# Patient Record
Sex: FEMALE | Race: WHITE | NOT HISPANIC OR LATINO | Employment: OTHER | URBAN - METROPOLITAN AREA
[De-identification: names, ages, dates, MRNs, and addresses within clinical notes are randomized per-mention and may not be internally consistent; named-entity substitution may affect disease eponyms.]

---

## 2024-06-13 RX ORDER — GABAPENTIN 800 MG/1
800 TABLET ORAL 3 TIMES DAILY
COMMUNITY
Start: 2024-05-31

## 2024-06-13 RX ORDER — PHENTERMINE HYDROCHLORIDE 37.5 MG/1
30 CAPSULE ORAL EVERY MORNING
COMMUNITY

## 2024-06-13 RX ORDER — BACLOFEN 10 MG/1
10 TABLET ORAL 3 TIMES DAILY
COMMUNITY
Start: 2024-04-30

## 2024-06-13 RX ORDER — IBUPROFEN 800 MG/1
800 TABLET ORAL EVERY 6 HOURS PRN
COMMUNITY
Start: 2024-05-31

## 2024-06-13 RX ORDER — ESOMEPRAZOLE MAGNESIUM 40 MG/1
40 CAPSULE, DELAYED RELEASE ORAL
COMMUNITY
Start: 2024-03-01

## 2024-06-13 RX ORDER — HYDROXYZINE HYDROCHLORIDE 25 MG/1
25 TABLET, FILM COATED ORAL EVERY 4 HOURS PRN
COMMUNITY
Start: 2024-04-30

## 2024-06-13 RX ORDER — ESCITALOPRAM OXALATE 20 MG/1
20 TABLET ORAL DAILY
COMMUNITY
Start: 2024-04-30

## 2024-06-13 NOTE — PRE-PROCEDURE INSTRUCTIONS
PATIENT INSTRUCTED TO BE:    - NPO AFTER MIDNIGHT EXCEPT CAN HAVE CLEAR LIQUIDS 2 HOURS PRIOR TO SURGERY ARRIVAL TIME     - TO HOLD ALL VITAMINS, SUPPLEMENTS, NSAIDS FOR ONE WEEK PRIOR TO THEIR SURGICAL PROCEDURE    - INSTRUCTED PT TO USE SURGICAL SOAP 1 TIME THE NIGHT PRIOR TO SURGERY OR THE AM OF SURGERY.   USE SOAP FROM NECK TO TOES AVOID THEIR FACE, HAIR, AND PRIVATE PARTS. INSTRUCTED NO LOTIONS, JEWELRY, PIERCINGS, OR DEODORANT DAY OF SURGERY        - INSTRUCTED TO TAKE THE FOLLOWING MEDICATIONS THE DAY OF SURGERY:   Gabapentin, Nexium, Lexapro    PATIENT VERBALIZED UNDERSTANDING

## 2024-06-18 ENCOUNTER — ANESTHESIA EVENT (OUTPATIENT)
Dept: PERIOP | Facility: HOSPITAL | Age: 54
End: 2024-06-18
Payer: MEDICARE

## 2024-06-18 NOTE — ANESTHESIA PREPROCEDURE EVALUATION
Anesthesia Evaluation     Patient summary reviewed and Nursing notes reviewed   no history of anesthetic complications:   NPO Solid Status: > 8 hours  NPO Liquid Status: > 2 hours           Airway   Mallampati: I  TM distance: >3 FB  Neck ROM: full  No difficulty expected  Dental - normal exam     Pulmonary - negative pulmonary ROS and normal exam    breath sounds clear to auscultation  Cardiovascular - negative cardio ROS and normal exam  Exercise tolerance: good (4-7 METS)    Rhythm: regular  Rate: normal        Neuro/Psych  (+) psychiatric history Anxiety and Depression  GI/Hepatic/Renal/Endo    (+) GERD (nexium) well controlled    Musculoskeletal (-) negative ROS    Abdominal  - normal exam   Substance History - negative use     OB/GYN negative ob/gyn ROS         Other - negative ROS       ROS/Med Hx Other: PAT Nursing Notes unavailable.                 Anesthesia Plan    ASA 2     general     (Patient understands anesthesia not responsible for dental damage.)  intravenous induction     Anesthetic plan, risks, benefits, and alternatives have been provided, discussed and informed consent has been obtained with: patient.    Plan discussed with CRNA.    CODE STATUS:

## 2024-06-19 ENCOUNTER — ANESTHESIA (OUTPATIENT)
Dept: PERIOP | Facility: HOSPITAL | Age: 54
End: 2024-06-19
Payer: MEDICARE

## 2024-06-19 ENCOUNTER — HOSPITAL ENCOUNTER (OUTPATIENT)
Facility: HOSPITAL | Age: 54
Setting detail: HOSPITAL OUTPATIENT SURGERY
Discharge: HOME | End: 2024-06-19
Attending: PODIATRIST | Admitting: PODIATRIST
Payer: MEDICARE

## 2024-06-19 ENCOUNTER — APPOINTMENT (OUTPATIENT)
Dept: GENERAL RADIOLOGY | Facility: HOSPITAL | Age: 54
End: 2024-06-19
Payer: MEDICARE

## 2024-06-19 VITALS
HEART RATE: 55 BPM | OXYGEN SATURATION: 98 % | HEIGHT: 66 IN | SYSTOLIC BLOOD PRESSURE: 143 MMHG | BODY MASS INDEX: 27.92 KG/M2 | WEIGHT: 173.72 LBS | DIASTOLIC BLOOD PRESSURE: 90 MMHG | TEMPERATURE: 97 F | RESPIRATION RATE: 16 BRPM

## 2024-06-19 PROBLEM — M20.12 HALLUX VALGUS WITH BUNIONS OF LEFT FOOT: Status: RESOLVED | Noted: 2024-05-17 | Resolved: 2024-06-19

## 2024-06-19 PROBLEM — M79.672 PAIN OF LEFT FOOT: Status: RESOLVED | Noted: 2024-05-17 | Resolved: 2024-06-19

## 2024-06-19 PROBLEM — M21.612 HALLUX VALGUS WITH BUNIONS OF LEFT FOOT: Status: RESOLVED | Noted: 2024-05-17 | Resolved: 2024-06-19

## 2024-06-19 PROCEDURE — 25010000002 ONDANSETRON PER 1 MG: Performed by: NURSE ANESTHETIST, CERTIFIED REGISTERED

## 2024-06-19 PROCEDURE — 25810000003 LACTATED RINGERS PER 1000 ML: Performed by: ANESTHESIOLOGY

## 2024-06-19 PROCEDURE — C1713 ANCHOR/SCREW BN/BN,TIS/BN: HCPCS | Performed by: PODIATRIST

## 2024-06-19 PROCEDURE — 25010000002 MIDAZOLAM PER 1MG: Performed by: ANESTHESIOLOGY

## 2024-06-19 PROCEDURE — 25010000002 CEFAZOLIN PER 500 MG: Performed by: STUDENT IN AN ORGANIZED HEALTH CARE EDUCATION/TRAINING PROGRAM

## 2024-06-19 PROCEDURE — 25810000003 LACTATED RINGERS PER 1000 ML: Performed by: NURSE ANESTHETIST, CERTIFIED REGISTERED

## 2024-06-19 PROCEDURE — 25010000002 SUGAMMADEX 200 MG/2ML SOLUTION: Performed by: NURSE ANESTHETIST, CERTIFIED REGISTERED

## 2024-06-19 PROCEDURE — 25010000002 HYDROMORPHONE 1 MG/ML SOLUTION: Performed by: NURSE ANESTHETIST, CERTIFIED REGISTERED

## 2024-06-19 PROCEDURE — 25010000002 PROPOFOL 200 MG/20ML EMULSION: Performed by: NURSE ANESTHETIST, CERTIFIED REGISTERED

## 2024-06-19 PROCEDURE — 25010000002 FENTANYL CITRATE (PF) 50 MCG/ML SOLUTION: Performed by: NURSE ANESTHETIST, CERTIFIED REGISTERED

## 2024-06-19 PROCEDURE — 25010000002 BUPIVACAINE (PF) 0.25 % SOLUTION: Performed by: PODIATRIST

## 2024-06-19 PROCEDURE — 76000 FLUOROSCOPY <1 HR PHYS/QHP: CPT

## 2024-06-19 DEVICE — RAPID COMPRESSION IMPLANTS
Type: IMPLANTABLE DEVICE | Site: FOOT | Status: FUNCTIONAL
Brand: LAPIPLASTY SPEEDPLATE

## 2024-06-19 DEVICE — 4.0MM FULLY THREADED SCREW - LONG (38MM/42MM)
Type: IMPLANTABLE DEVICE | Site: FOOT | Status: FUNCTIONAL
Brand: LAPIPLASTY® 4.0MM FULLY THREADED SCREW

## 2024-06-19 RX ORDER — MAGNESIUM HYDROXIDE 1200 MG/15ML
LIQUID ORAL AS NEEDED
Status: DISCONTINUED | OUTPATIENT
Start: 2024-06-19 | End: 2024-06-19 | Stop reason: HOSPADM

## 2024-06-19 RX ORDER — OXYCODONE HYDROCHLORIDE 5 MG/1
5 TABLET ORAL
Status: COMPLETED | OUTPATIENT
Start: 2024-06-19 | End: 2024-06-19

## 2024-06-19 RX ORDER — MIDAZOLAM HYDROCHLORIDE 2 MG/2ML
2 INJECTION, SOLUTION INTRAMUSCULAR; INTRAVENOUS ONCE
Status: COMPLETED | OUTPATIENT
Start: 2024-06-19 | End: 2024-06-19

## 2024-06-19 RX ORDER — MEPERIDINE HYDROCHLORIDE 25 MG/ML
12.5 INJECTION INTRAMUSCULAR; INTRAVENOUS; SUBCUTANEOUS
Status: DISCONTINUED | OUTPATIENT
Start: 2024-06-19 | End: 2024-06-19 | Stop reason: HOSPADM

## 2024-06-19 RX ORDER — PROMETHAZINE HYDROCHLORIDE 12.5 MG/1
25 TABLET ORAL ONCE AS NEEDED
Status: DISCONTINUED | OUTPATIENT
Start: 2024-06-19 | End: 2024-06-19 | Stop reason: HOSPADM

## 2024-06-19 RX ORDER — ACETAMINOPHEN 500 MG
1000 TABLET ORAL ONCE
Status: COMPLETED | OUTPATIENT
Start: 2024-06-19 | End: 2024-06-19

## 2024-06-19 RX ORDER — SODIUM CHLORIDE, SODIUM LACTATE, POTASSIUM CHLORIDE, CALCIUM CHLORIDE 600; 310; 30; 20 MG/100ML; MG/100ML; MG/100ML; MG/100ML
INJECTION, SOLUTION INTRAVENOUS CONTINUOUS PRN
Status: DISCONTINUED | OUTPATIENT
Start: 2024-06-19 | End: 2024-06-19 | Stop reason: SURG

## 2024-06-19 RX ORDER — PROMETHAZINE HYDROCHLORIDE 25 MG/1
25 SUPPOSITORY RECTAL ONCE AS NEEDED
Status: DISCONTINUED | OUTPATIENT
Start: 2024-06-19 | End: 2024-06-19 | Stop reason: HOSPADM

## 2024-06-19 RX ORDER — ROCURONIUM BROMIDE 10 MG/ML
INJECTION, SOLUTION INTRAVENOUS AS NEEDED
Status: DISCONTINUED | OUTPATIENT
Start: 2024-06-19 | End: 2024-06-19 | Stop reason: SURG

## 2024-06-19 RX ORDER — ONDANSETRON 2 MG/ML
4 INJECTION INTRAMUSCULAR; INTRAVENOUS ONCE AS NEEDED
Status: DISCONTINUED | OUTPATIENT
Start: 2024-06-19 | End: 2024-06-19 | Stop reason: HOSPADM

## 2024-06-19 RX ORDER — LIDOCAINE HYDROCHLORIDE 20 MG/ML
INJECTION, SOLUTION EPIDURAL; INFILTRATION; INTRACAUDAL; PERINEURAL AS NEEDED
Status: DISCONTINUED | OUTPATIENT
Start: 2024-06-19 | End: 2024-06-19 | Stop reason: SURG

## 2024-06-19 RX ORDER — PROPOFOL 10 MG/ML
INJECTION, EMULSION INTRAVENOUS AS NEEDED
Status: DISCONTINUED | OUTPATIENT
Start: 2024-06-19 | End: 2024-06-19 | Stop reason: SURG

## 2024-06-19 RX ORDER — SODIUM CHLORIDE, SODIUM LACTATE, POTASSIUM CHLORIDE, CALCIUM CHLORIDE 600; 310; 30; 20 MG/100ML; MG/100ML; MG/100ML; MG/100ML
9 INJECTION, SOLUTION INTRAVENOUS CONTINUOUS PRN
Status: DISCONTINUED | OUTPATIENT
Start: 2024-06-19 | End: 2024-06-19 | Stop reason: HOSPADM

## 2024-06-19 RX ORDER — FENTANYL CITRATE 50 UG/ML
INJECTION, SOLUTION INTRAMUSCULAR; INTRAVENOUS AS NEEDED
Status: DISCONTINUED | OUTPATIENT
Start: 2024-06-19 | End: 2024-06-19 | Stop reason: SURG

## 2024-06-19 RX ORDER — ONDANSETRON 2 MG/ML
INJECTION INTRAMUSCULAR; INTRAVENOUS AS NEEDED
Status: DISCONTINUED | OUTPATIENT
Start: 2024-06-19 | End: 2024-06-19 | Stop reason: SURG

## 2024-06-19 RX ORDER — LIDOCAINE HYDROCHLORIDE 10 MG/ML
INJECTION, SOLUTION EPIDURAL; INFILTRATION; INTRACAUDAL; PERINEURAL AS NEEDED
Status: DISCONTINUED | OUTPATIENT
Start: 2024-06-19 | End: 2024-06-19 | Stop reason: HOSPADM

## 2024-06-19 RX ORDER — DEXMEDETOMIDINE HYDROCHLORIDE 100 UG/ML
INJECTION, SOLUTION INTRAVENOUS AS NEEDED
Status: DISCONTINUED | OUTPATIENT
Start: 2024-06-19 | End: 2024-06-19 | Stop reason: SURG

## 2024-06-19 RX ORDER — BUPIVACAINE HYDROCHLORIDE 2.5 MG/ML
INJECTION, SOLUTION EPIDURAL; INFILTRATION; INTRACAUDAL AS NEEDED
Status: DISCONTINUED | OUTPATIENT
Start: 2024-06-19 | End: 2024-06-19 | Stop reason: HOSPADM

## 2024-06-19 RX ADMIN — PROPOFOL 130 MG: 10 INJECTION, EMULSION INTRAVENOUS at 07:42

## 2024-06-19 RX ADMIN — HYDROMORPHONE HYDROCHLORIDE 0.5 MG: 1 INJECTION, SOLUTION INTRAMUSCULAR; INTRAVENOUS; SUBCUTANEOUS at 09:41

## 2024-06-19 RX ADMIN — FENTANYL CITRATE 50 MCG: 50 INJECTION, SOLUTION INTRAMUSCULAR; INTRAVENOUS at 07:58

## 2024-06-19 RX ADMIN — MIDAZOLAM HYDROCHLORIDE 2 MG: 1 INJECTION, SOLUTION INTRAMUSCULAR; INTRAVENOUS at 07:11

## 2024-06-19 RX ADMIN — LIDOCAINE HYDROCHLORIDE 100 MG: 20 INJECTION, SOLUTION EPIDURAL; INFILTRATION; INTRACAUDAL; PERINEURAL at 07:42

## 2024-06-19 RX ADMIN — SODIUM CHLORIDE, POTASSIUM CHLORIDE, SODIUM LACTATE AND CALCIUM CHLORIDE: 600; 310; 30; 20 INJECTION, SOLUTION INTRAVENOUS at 09:03

## 2024-06-19 RX ADMIN — DEXMEDETOMIDINE 40 MCG: 100 INJECTION, SOLUTION INTRAVENOUS at 08:29

## 2024-06-19 RX ADMIN — SODIUM CHLORIDE, POTASSIUM CHLORIDE, SODIUM LACTATE AND CALCIUM CHLORIDE 9 ML/HR: 600; 310; 30; 20 INJECTION, SOLUTION INTRAVENOUS at 07:08

## 2024-06-19 RX ADMIN — FENTANYL CITRATE 50 MCG: 50 INJECTION, SOLUTION INTRAMUSCULAR; INTRAVENOUS at 07:42

## 2024-06-19 RX ADMIN — ACETAMINOPHEN 1000 MG: 500 TABLET ORAL at 07:08

## 2024-06-19 RX ADMIN — OXYCODONE 5 MG: 5 TABLET ORAL at 10:00

## 2024-06-19 RX ADMIN — OXYCODONE 5 MG: 5 TABLET ORAL at 09:46

## 2024-06-19 RX ADMIN — SODIUM CHLORIDE, POTASSIUM CHLORIDE, SODIUM LACTATE AND CALCIUM CHLORIDE: 600; 310; 30; 20 INJECTION, SOLUTION INTRAVENOUS at 07:37

## 2024-06-19 RX ADMIN — SODIUM CHLORIDE 2000 MG: 9 INJECTION, SOLUTION INTRAVENOUS at 07:44

## 2024-06-19 RX ADMIN — ROCURONIUM BROMIDE 50 MG: 10 INJECTION, SOLUTION INTRAVENOUS at 07:42

## 2024-06-19 RX ADMIN — ONDANSETRON 4 MG: 2 INJECTION INTRAMUSCULAR; INTRAVENOUS at 08:52

## 2024-06-19 RX ADMIN — SUGAMMADEX 200 MG: 100 INJECTION, SOLUTION INTRAVENOUS at 09:14

## 2024-06-19 RX ADMIN — PROPOFOL 70 MG: 10 INJECTION, EMULSION INTRAVENOUS at 09:15

## 2024-06-19 NOTE — ANESTHESIA POSTPROCEDURE EVALUATION
Patient: Lou Berrios    Procedure Summary       Date: 06/19/24 Room / Location: [facility] OR 01 / [facility] MAIN OR    Anesthesia Start: 0737 Anesthesia Stop: 0927    Procedures:       BUNIONECTOMY , WITH FIRST METATARSAL CUNEIFORM JOINT FUSION, CALCANEAL  AUTOGRAFT HARVEST,  midfoot fusion (Left)      METATARSAL CUNEIFORM ARTHRODESIS (Left: Foot) Diagnosis:       Hallux valgus with bunions of left foot      Pain of left foot      (Hallux valgus with bunions of left foot [M20.12, M21.612])      (Pain of left foot [M79.672])    Surgeons: Michele Mc DPM Provider: Rose Ceja MD    Anesthesia Type: general ASA Status: 2            Anesthesia Type: general    Vitals  Vitals Value Taken Time   /80 06/19/24 1010   Temp 36.3 °C (97.4 °F) 06/19/24 0955   Pulse 62 06/19/24 1010   Resp 17 06/19/24 1010   SpO2 98 % 06/19/24 1010           Post Anesthesia Care and Evaluation    Patient location during evaluation: bedside  Patient participation: complete - patient participated  Level of consciousness: awake  Pain management: adequate    Airway patency: patent  PONV Status: none  Cardiovascular status: acceptable and stable  Respiratory status: acceptable  Hydration status: acceptable

## 2024-06-19 NOTE — DISCHARGE INSTRUCTIONS
DISCHARGE INSTRUCTIONS  SURGICAL / AMBULATORY  PROCEDURES      For your surgery you had:  General anesthesia (you may have a sore throat for the first 24 hours)    You may experience dizziness, drowsiness, or light-headedness for several hours following surgery/procedure.  Do not stay alone today or tonight.  Limit your activity for 24 hours.  Resume your diet slowly.  Follow whatever special dietary instructions you may have been given by your doctor.  You should not drive or operate machinery, drink alcohol, or sign legally binding documents for 24 hours or while you are taking pain medication.  Last dose of pain medication was given at:   Tylenol at 0708am oxycodone  10mg po at 1000am May start pain meds at home after 200pm   .  NOTIFY YOUR DOCTOR IF YOU EXPERIENCE ANY OF THE FOLLOWING:  Temperature greater than 101 degrees Fahrenheit  Shaking Chills  Redness or excessive drainage from incision  Nausea, vomiting and/or pain that is not controlled by prescribed medications  Increase in bleeding or bleeding that is excessive  Unable to urinate in 6 hours after surgery  If unable to reach your doctor, please go to the closest Emergency Room      You may shower or bathe keep dressing dry and intact wrap with plastic bag when bathing  .  Apply an ice pack 24-48 hours.behind knee and on foot  Medications per physician instructions as indicated on Discharge Medication Information Sheet.    SPECIAL INSTRUCTIONS:

## 2024-06-19 NOTE — H&P
.  Subjective .     History of present illness:  Lou Berrios is a 53 y.o. female who presents with painful bunion on the left foot. Presenting for surgery.        Review of Systems  A 10 point review of systems was conducted and was negative other than that mentioned in the HPI    History  Past Medical History:   Diagnosis Date    Acid reflux     Anxiety     Bunion     Low back pain    ,   Past Surgical History:   Procedure Laterality Date    CLAVICLE SURGERY Right     over 10 years ago   ,   Family History   Problem Relation Age of Onset    Malig Hyperthermia Neg Hx    ,   Social History     Tobacco Use    Smoking status: Former     Current packs/day: 0.00     Types: Cigarettes     Quit date:      Years since quittin.4    Smokeless tobacco: Never   Vaping Use    Vaping status: Never Used   Substance Use Topics    Alcohol use: Yes     Comment: occasional    Drug use: Never   ,   Medications Prior to Admission   Medication Sig Dispense Refill Last Dose    baclofen (LIORESAL) 10 MG tablet Take 1 tablet by mouth 3 (Three) Times a Day.   Past Month    esomeprazole (nexIUM) 40 MG capsule Take 1 capsule by mouth Every Morning Before Breakfast.   2024 at 0430    gabapentin (NEURONTIN) 800 MG tablet Take 1 tablet by mouth 3 (Three) Times a Day.   2024 at 0430    hydrOXYzine (ATARAX) 25 MG tablet Take 1 tablet by mouth Every 4 (Four) Hours As Needed for Anxiety.   Past Week    cephalexin (KEFLEX) 500 MG capsule Take 1 capsule by mouth twice a day 14 capsule 0     escitalopram (LEXAPRO) 20 MG tablet Take 1 tablet by mouth Daily.   More than a month    ibuprofen (ADVIL,MOTRIN) 800 MG tablet Take 1 tablet by mouth Every 6 (Six) Hours As Needed.   2024    oxyCODONE-acetaminophen (Percocet) 5-325 MG per tablet Take 1 tablet by mouth three times a day 21 tablet 0     phentermine 37.5 MG capsule Take 30 mg by mouth Every Morning. Last dose May 15-   More than a month    promethazine (PHENERGAN)  25 MG tablet Take 1 tablet by mouth every 8 hours 21 tablet 0    , Scheduled Meds:  acetaminophen, 1,000 mg, Oral, Once  ceFAZolin, 2,000 mg, Intravenous, Once  midazolam, 2 mg, Intravenous, Once    , PRN Meds:    lactated ringers and Allergies:  Patient has no known allergies.    Objective     Vital Signs   Temp:  [97 °F (36.1 °C)] 97 °F (36.1 °C)  Heart Rate:  [73] 73  Resp:  [16] 16  BP: (136)/(88) 136/88    Physical Exam:     Constitutional     The patient is awake, alert, well developed, well nourished and well groomed.   Cardiovascular  NORMAL PULSES:  Palpable dorsalis pedis, posterior tibial pulses bilateral foot. Capillary refill time intact to all digits. No edema bilateral foot.    No Varicosities or Telangectasias bilateral lower extremity.   No dependent rubor or cyanosis bilateral foot.   No stasis pigmentation bilateral lower extremities.   Musculoskeletal  bilateral lower extremities, left worse than right, range of motion within normal limits. Muscle strength within normal limits. Ankle, subtalar, talonavicular joints unremarkable, nonpainful patient has pain with palpation and range of motion of bilateral first metatarsophalangeal joints, pain with palpation to median eminence. Hallux abductovalgus deformity noted with lateral drifting of hallux, bilateral first metatarsal cuneiform joints exhibit hypermobility. Gait significantly antalgic. No improvement from previous examination   Dermatologic  Integument is warm, dry and supple with hair present at level of digits bilateral.   Skin is noted to have normal texture, texture and elasticity bilateral.       No erythema or increased warmth bilateral.      bilateral lower extremities thickened, hyperkeratotic lesions noted submet 2, 3, 4, 5   Neurologic     The deep tendon reflexes of the lower extremities are symmetrical; they are graded at 2/4. Sensation is grossly intact bilateral   Sensation intact at level of digits with SWMF 5.07g bilateral ;  Proprioception and Vibration with 126 HZ tuning fork are intact bilateral.     Results Review:   I reviewed the patient's new clinical results.      Assessment & Plan       Hallux valgus with bunions of left foot    Pain of left foot      Plan:     .Surgical management is desired and recommended.  Patient understands risks and benefits of surgery and signed a consent form.  Risks include persistent or reoccurring deformity, pain, infection.  Benefits include deformity correction, pain reduction.  Opportunity for the patient to ask questions was given and all questions were answered to patient's satisfaction.  Patient demonstrate understanding of postoperative protocol including weightbearing and activity limitations.    Surgical plan is left foot Lapidus bunionectomy with first metatarsal cuneiform arthrodesis possible midfoot fusion and calcaneal autograft harvest and application    I discussed the patients findings and my recommendations with patient and nursing staff    Maricel Oliveros DPM  06/19/24  07:03 EDT    Time: More than 50% of time spent in counseling and coordination of care:  Total face-to-face/floor time 30 min.  Time spent in counseling 30 min. Counseling included the following topics: coordination of care, risks, benefits, complications, alternatives to treatment including both non surgical and surgical options.

## 2024-06-19 NOTE — OP NOTE
BUNIONECTOMY LAPIDUS, METATARSAL CUNEIFORM ARTHRODESIS  Procedure Report    Patient Name:  Lou Berrios  YOB: 1970    Date of Surgery:  6/19/2024     Indications: Patient has attempted and failed conservative tx for the outlined diagnosis.  Surgical management is recommended.  Possible perioperative complications were reviewed the patient.  All questions were welcomed and answered to the patient satisfaction patient desires to proceed with surgery and signed a consent form.       Pre-op Diagnosis:   Hallux valgus with bunions of left foot [M20.12, M21.612]  Pain of left foot [M79.672]       Post-Op Diagnosis Codes:     * Hallux valgus with bunions of left foot [M20.12, M21.612]     * Pain of left foot [M79.672]    Procedure/CPT® Codes:      Procedure(s):  BUNIONECTOMY , WITH FIRST METATARSAL CUNEIFORM JOINT FUSION, CALCANEAL  AUTOGRAFT HARVEST,  midfoot fusion  METATARSAL CUNEIFORM ARTHRODESIS    Staff:  Surgeon(s):  Michele Mc DPM Ahmed, Ali Syed, DPM         Anesthesia: General    Estimated Blood Loss: minimal    Implants:    Implant Name Type Inv. Item Serial No.  Lot No. LRB No. Used Action   STPL BONE SPEEDPLATE RAPD/COMPR ANDRE/4 [id number] - [id number] Implant STPL BONE SPEEDPLATE RAPD/COMPR ANDRE/4 [id number]  [employer] [id number] Left 2 Implanted   SCRW FUL/THRD LAPIPLASTY [id number] STRL - [id number] Implant SCRW FUL/THRD LAPIPLASTY [id number] STRL  [employer] [id number] Left 1 Implanted       Specimen:          None        Findings: Consistent with preoperative diagnosis    Complications: None    Description of Procedure:     Patient was transferred to the OR table and placed in the supine position.  A timeout was performed in which identification of correct patient, procedure, laterality, materials was done.  Anesthesia was completed.  A local anesthetic block was conducted which consisted of 10 cc of 1% lidocaine plain.  The extremity to  be operated on was prepped and draped in normal sterile fashion.  Extremity to be operated on was then exsanguinated and the calf tourniquet was inflated.    .Attention was directed to the left dorsal first metatarsal cuneiform joint.  Incision was carried down to the level of the subcutaneous tissue with care being taken to avoid damage neurovascular and tendinous structures.  All superficial vasculature was electrocauterized using a Bovie.  Incision was carried down to level the periosteum which was carefully reflected all important neurovascular structures were retracted safely.  The joint was found and incised.  A osteotome from the Lapiplasty kit was used to free up any plantar and lateral soft tissue attachments.  This allowed for adequate rotation in both the transverse and the frontal plane.  A saw from the Lapiplasty kit was then used to plane the first metatarsal cuneiform joint to allow for even more free rotation.    At this point attention was then directed to the distal lateral aspect of the first metatarsophalangeal joint where a stab incision was made and deepened using a hemostat bluntly.  The Lapiplasty speed release blade and handle was used to complete the lateral release which successfully released all attached lateral structures.  This lateral release was then completed using a 15 blade.  This allowed for a correction of the lateralizing deformity of the hallux.      Attention was then directed back to the first metatarsal cuneiform joint where the C-clamp was placed on the medial skin of the first ray and the hook being placed on the lateral cortex of the second metatarsal on the midshaft region after a stab incision was made and bluntly dissected using hemostat.  The C-clamp was then placed appropriately and tightened this allowed for clinically obvious correction of the bunion deformity.  Intraoperative fluoroscopy was then used to confirm correction of deformity and correction of the  translation of the sesamoids.  The 3 in 1 guide was then placed in the joint and the intraoperative fluoroscopy was again used to note adequate placement of the guide in anticipation of planned resection of the joint.  It had been determined that the guide was placed appropriately thus 2 pins proximal and distal were placed along with the third being in the medial oblique side of the guide another medial K wire was placed from the first to the second metatarsal through the C clamp cannula.  This allowed for appropriate temporary fixation of the guide and the deformity.     After this the Lapiplasty sagittal blade was placed into the cut guide and cut wedges from the first metatarsal base and medial cuneiform.  Cut bone was removed using sharp and blunt dissection and the joint space was inspected no more further dissection needed.  Next, the third smooth pin was removed from the cut guide and the guide was removed.  Next a compressor was placed over the 2 remaining parallel smooth wires and we distracted the joint to inspect it any more bone need to be resected and no more resection was necessary.      Attention was then directed to the lateral calcaneus where a small stab incision was made on the lateral wall.  The incision was further deepened down using blunt dissection.  The calcaneal autograft harvester was then used to resect approximately 2 cc of calcaneal bone graft (small) to be later placed into the fusion site    We used a 2 mm drill and fenestrated the subchondral bone of the first metatarsal and medial cuneiform.  The calcaneal autograft was placed in the fusion site.  Next we compressed the first metatarsal and medial cuneiform at the TMT joint using the compressor distractor Lapiplasty equipment and confirmed proper alignment AP and laterally under fluoroscopy.  Next we inserted a threaded olive wire from the medial aspect of the first metatarsal diaphysis to center of the medial cuneiform to hold  proper alignment and reduction.  A second wire was placed from plantar medial to dorsal lateral from the first metatarsal to the  medial cuneiform.     Drilled holes the area was then tamped with a mallet so that the plate was sitting flush with the bones in the fusion site.  A second plate was then placed on the medial aspect of the fusion site in similar fashion with no deviation to technique intraoperative fluoroscopy was then again used to confirm adequate placement of hardware and correction of the deformity and bony apposition of the fusion site.      An intraoperative splay test of the Lisfranc complex was conducted and was determined to have residual instability and hypermobility.  Thus, a midfoot fusion was indicated.  K wire was driven from the medial aspect of the first metatarsal base into the second cuneiform and was confirmed positioning on fluoroscopy.  The proximal cortex was drilled and countersunk.  Depth gauge was used to determine a size 38 screw to be used.  Screw was then placed appropriately.  Compression of the joint complex as well as placement of the hardware was determined due to the appropriate.  Intraoperative splay test was negative for instability in the Lisfranc complex following this fusion.     The area was then irrigated with copious amounts of normal saline and the area was then closed in layers using 4-0 Vicryl and 3-0 Prolene suture. The tourniquet was deflated.  An additional 10 cc of 0.5% marcaine plain was injected to the area the area was then dressed with a dry sterile dressing and a posterior splint.    The patient tolerated the procedure and anesthesia well and was transferred to the PACU vital signs stable vascular status intact and capillary refill intact all digits.  Postoperative instructions were reviewed with the patient with written instructions provided.  Patient is expected to follow-up in clinic in 5 to 7 days for first postop visit.  The patient has a number of  the clinic and was instructed to call prior to that time should any questions or concerns arise.                  Maricel Oliveros DPM     Date: 6/19/2024  Time: 09:27 EDT

## 2025-03-04 ENCOUNTER — OFFICE VISIT (OUTPATIENT)
Dept: ORTHOPEDIC SURGERY | Facility: CLINIC | Age: 55
End: 2025-03-04
Payer: MEDICARE

## 2025-03-04 VITALS — BODY MASS INDEX: 29.83 KG/M2 | HEIGHT: 66 IN | HEART RATE: 78 BPM | WEIGHT: 185.6 LBS | OXYGEN SATURATION: 97 %

## 2025-03-04 DIAGNOSIS — M65.341 TRIGGER RING FINGER OF RIGHT HAND: ICD-10-CM

## 2025-03-04 DIAGNOSIS — M79.641 RIGHT HAND PAIN: Primary | ICD-10-CM

## 2025-03-04 RX ORDER — TRIAMCINOLONE ACETONIDE 40 MG/ML
40 INJECTION, SUSPENSION INTRA-ARTICULAR; INTRAMUSCULAR
Status: COMPLETED | OUTPATIENT
Start: 2025-03-04 | End: 2025-03-04

## 2025-03-04 RX ORDER — LIDOCAINE HYDROCHLORIDE 10 MG/ML
1 INJECTION, SOLUTION INFILTRATION; PERINEURAL
Status: COMPLETED | OUTPATIENT
Start: 2025-03-04 | End: 2025-03-04

## 2025-03-04 RX ORDER — PAROXETINE 20 MG/1
TABLET, FILM COATED ORAL
COMMUNITY
Start: 2025-02-25

## 2025-03-04 RX ADMIN — TRIAMCINOLONE ACETONIDE 40 MG: 40 INJECTION, SUSPENSION INTRA-ARTICULAR; INTRAMUSCULAR at 10:00

## 2025-03-04 RX ADMIN — LIDOCAINE HYDROCHLORIDE 1 ML: 10 INJECTION, SOLUTION INFILTRATION; PERINEURAL at 10:00

## 2025-03-04 NOTE — PROGRESS NOTES
"Chief Complaint  Initial Evaluation of the Right Hand     Subjective      Lou Berrios presents to [facility] for initial evaluation of the right hand. She has triggering of the right hand 4 th digit.  She notes locking and pain.  She has had Symptoms for 3 weeks.  She has pain over the A1 pulley.      No Known Allergies     Social History     Socioeconomic History    Marital status:    Tobacco Use    Smoking status: Former     Current packs/day: 0.00     Types: Cigarettes     Quit date:      Years since quittin.1    Smokeless tobacco: Never   Vaping Use    Vaping status: Never Used   Substance and Sexual Activity    Alcohol use: Yes     Comment: occasional    Drug use: Never    Sexual activity: Defer        I reviewed the patient's chief complaint, history of present illness, review of systems, past medical history, surgical history, family history, social history, medications, and allergy list.     Review of Systems     Constitutional: Denies fevers, chills, weight loss  Cardiovascular: Denies chest pain, shortness of breath  Skin: Denies rashes, acute skin changes  Neurologic: Denies headache, loss of consciousness        Vital Signs:   Pulse 78   Ht 167.6 cm (66\")   Wt 84.2 kg (185 lb 9.6 oz)   SpO2 97%   BMI 29.96 kg/m²          Physical Exam  General: Alert. No acute distress    Ortho Exam        RIGHT HAND Negative Compression testing/ Negative Tinels. NegativeFinkelsteins. Negative Morales's testing. Negative CMC grind testing. Negative Phalens. Full ROM of the hand, fingers, elbow and wrist. Positive Triggering of the digit. Sensation grossly intact to light touch, median, radial and ulnar nerve. Positive AIN, PIN and ulnar nerve motor function intact. Axillary nerve intact. Positive pulses.  Tender over the A1 pulley of the 4 th digit of the right hand.       Right Trigger Finger Injection  Consent given by: patient  Site marked: site " marked  Timeout: Immediately prior to procedure a time out was called to verify the correct patient, procedure, equipment, support staff and site/side marked as required   Supporting Documentation  Indications: pain   Procedure Details  Location: -   Location: Right Trigger Finger Injection.Preparation: Patient was prepped and draped in the usual sterile fashion  Needle gauge: 23G.  Medications administered: 1 mL lidocaine 1 %; 40 mg triamcinolone acetonide 40 MG/ML  Patient tolerance: patient tolerated the procedure well with no immediate complications      This injection documentation was Scribed for Alana Acosta MD by Macey Jaramillo.  03/04/25   10:43 EST      Imaging Results (Most Recent)       None             Result Review :        Assessment and Plan     Diagnoses and all orders for this visit:    1. Right hand pain (Primary)    2. Trigger ring finger of right hand        Discussed the treatment plan with the patient.     Discussed the treatment options with the patient, operative vs non-operative. The patient is a candidate for a trigger finger release of the 4 th digit whenever she is ready.      Discussed conservative measures as injections and anti inflammatory.      Discussed the risks and benefits of conservative measures. The patient expressed understanding and wished to proceed with a right hand trigger finger injection of the 4 th digit.  She tolerated the injection well     Discussed with the patient that due to the steroid injection given today in the office they may see an increase in blood sugar for a few days. Advised patient to monitor sugar after receiving the injection.     Discussed possibility of a reaction from the injection.  Discussed the possibility that the injection may not completely improve or remove the pain.  Discussed the risk of infection.        Call or return if worsening symptoms.    Follow Up     PRN      Patient was given instructions and counseling regarding her condition or  for health maintenance advice. Please see specific information pulled into the AVS if appropriate.     Scribed for Alana Acosta MD by Doreen Tovar MA.  03/04/25   10:20 EST    I have personally performed the services described in this document as scribed by the above individual and it is both accurate and complete. Alana Acosta MD 03/04/25

## 2025-03-25 ENCOUNTER — OFFICE VISIT (OUTPATIENT)
Dept: ORTHOPEDIC SURGERY | Facility: CLINIC | Age: 55
End: 2025-03-25
Payer: MEDICARE

## 2025-03-25 VITALS
HEIGHT: 66 IN | HEART RATE: 72 BPM | OXYGEN SATURATION: 98 % | SYSTOLIC BLOOD PRESSURE: 151 MMHG | DIASTOLIC BLOOD PRESSURE: 88 MMHG | WEIGHT: 187 LBS | BODY MASS INDEX: 30.05 KG/M2

## 2025-03-25 DIAGNOSIS — M25.551 BILATERAL HIP PAIN: Primary | ICD-10-CM

## 2025-03-25 DIAGNOSIS — M25.552 BILATERAL HIP PAIN: Primary | ICD-10-CM

## 2025-03-25 DIAGNOSIS — M70.62 TROCHANTERIC BURSITIS OF LEFT HIP: ICD-10-CM

## 2025-03-25 DIAGNOSIS — M70.61 TROCHANTERIC BURSITIS OF RIGHT HIP: ICD-10-CM

## 2025-03-25 PROCEDURE — 99213 OFFICE O/P EST LOW 20 MIN: CPT | Performed by: ORTHOPAEDIC SURGERY

## 2025-03-25 PROCEDURE — 20610 DRAIN/INJ JOINT/BURSA W/O US: CPT | Performed by: ORTHOPAEDIC SURGERY

## 2025-03-25 RX ORDER — LIDOCAINE HYDROCHLORIDE 10 MG/ML
5 INJECTION, SOLUTION INFILTRATION; PERINEURAL
Status: COMPLETED | OUTPATIENT
Start: 2025-03-25 | End: 2025-03-25

## 2025-03-25 RX ORDER — TRIAMCINOLONE ACETONIDE 40 MG/ML
40 INJECTION, SUSPENSION INTRA-ARTICULAR; INTRAMUSCULAR
Status: COMPLETED | OUTPATIENT
Start: 2025-03-25 | End: 2025-03-25

## 2025-03-25 RX ADMIN — LIDOCAINE HYDROCHLORIDE 5 ML: 10 INJECTION, SOLUTION INFILTRATION; PERINEURAL at 10:48

## 2025-03-25 RX ADMIN — TRIAMCINOLONE ACETONIDE 40 MG: 40 INJECTION, SUSPENSION INTRA-ARTICULAR; INTRAMUSCULAR at 10:48

## 2025-03-25 NOTE — PROGRESS NOTES
"Chief Complaint  Initial Evaluation of the Right Hip and Initial Evaluation of the Left Hip     Subjective      Lou Berrios presents to [facility] for initial evaluation of bilateral hips. The left hip is worse then the right.  She has had pain for 2 weeks.  She had no injury but has had increase pain on the lateral aspect of the hips.  She has to have injections about once a year for bursitis in the spring.     No Known Allergies     Social History     Socioeconomic History    Marital status:    Tobacco Use    Smoking status: Former     Current packs/day: 0.00     Types: Cigarettes     Quit date:      Years since quittin.2    Smokeless tobacco: Never   Vaping Use    Vaping status: Never Used   Substance and Sexual Activity    Alcohol use: Yes     Comment: occasional    Drug use: Never    Sexual activity: Defer        I reviewed the patient's chief complaint, history of present illness, review of systems, past medical history, surgical history, family history, social history, medications, and allergy list.     Review of Systems     Constitutional: Denies fevers, chills, weight loss  Cardiovascular: Denies chest pain, shortness of breath  Skin: Denies rashes, acute skin changes  Neurologic: Denies headache, loss of consciousness        Vital Signs:   /88   Pulse 72   Ht 167.6 cm (66\")   Wt 84.8 kg (187 lb)   SpO2 98%   BMI 30.18 kg/m²          Physical Exam  General: Alert. No acute distress    Ortho Exam        BILATERAL HIPS Mildly full ROM of the hips.   No skin discoloration, atrophy or swelling. Positive EHL, FHL, GS, and TA. Sensation intact to all 5 nerves of the foot. Pain with straight leg raise. Leg lengths appear equal. Ambulates with Antalgic gait Knee Extensor Mechanism  intact        Large Joint Arthrocentesis: R greater trochanteric bursa  Date/Time: 3/25/2025 10:48 AM  Consent given by: patient  Site marked: site marked  Timeout: " Immediately prior to procedure a time out was called to verify the correct patient, procedure, equipment, support staff and site/side marked as required   Supporting Documentation  Indications: pain   Procedure Details  Location: hip - R greater trochanteric bursa  Needle gauge: 21 G.  Medications administered: 5 mL lidocaine 1 %; 40 mg triamcinolone acetonide 40 MG/ML  Patient tolerance: patient tolerated the procedure well with no immediate complications      Large Joint Arthrocentesis: L greater trochanteric bursa  Date/Time: 3/25/2025 10:48 AM  Consent given by: patient  Site marked: site marked  Timeout: Immediately prior to procedure a time out was called to verify the correct patient, procedure, equipment, support staff and site/side marked as required   Supporting Documentation  Indications: pain   Procedure Details  Location: hip - L greater trochanteric bursa  Needle gauge: 21 G.  Medications administered: 5 mL lidocaine 1 %; 40 mg triamcinolone acetonide 40 MG/ML  Patient tolerance: patient tolerated the procedure well with no immediate complications      This injection documentation was Scribed for Alana Acosta MD by Macey Jaramillo.  03/25/25   10:49 EDT      Imaging Results (Most Recent)       Procedure Component Value Units Date/Time    XR Hips Bilateral With or Without Pelvis 3-4 View [[id number]] Resulted: 03/25/25 1014     Updated: 03/25/25 1016             Result Review :     X-Ray Report:  Bilateral hip X-Ray  Indication: Evaluation of the bilateral hips  AP/Lateral view(s)  Findings: Mild degenerative changes of the hips.   Prior studies available for comparison: no           Assessment and Plan     Diagnoses and all orders for this visit:    1. Bilateral hip pain (Primary)  -     XR Hips Bilateral With or Without Pelvis 3-4 View    2. Trochanteric bursitis of left hip    3. Trochanteric bursitis of right hip        Discussed the treatment plan with the patient. I reviewed the X-rays that were  obtained today with the patient.     Discussed the risks and benefits of conservative measures.  The patient expressed understanding and wished to proceed with bilateral hip bursa steroid injections.  She tolerated the injections well.     Discussed with the patient that due to the steroid injection given today in the office they may see an increase in blood sugar for a few days. Advised patient to monitor sugar after receiving the injection.     Discussed possibility of a reaction from the injection.  Discussed the possibility that the injection may not completely improve or remove the pain.  Discussed the risk of infection.    HEP exercises given.     Call or return if worsening symptoms.    Follow Up     PRN      Patient was given instructions and counseling regarding her condition or for health maintenance advice. Please see specific information pulled into the AVS if appropriate.     Scribed for Alana Acosta MD by Doreen Tovar MA.  03/25/25   10:12 EDT    I have personally performed the services described in this document as scribed by the above individual and it is both accurate and complete. Alana Acosta MD 03/25/25

## (undated) DEVICE — DRAPE,TOWEL,LARGE,INVISISHIELD: Brand: MEDLINE

## (undated) DEVICE — INTENDED FOR TISSUE SEPARATION, AND OTHER PROCEDURES THAT REQUIRE A SHARP SURGICAL BLADE TO PUNCTURE OR CUT.: Brand: BARD-PARKER ® CARBON RIB-BACK BLADES

## (undated) DEVICE — DRSNG PAD ABD 8X10IN STRL

## (undated) DEVICE — 3M™ STERI-DRAPE™ X-RAY IMAGE INTENSIFIER DRAPE, 10 PER CARTON / 4 CARTONS PER CASE, 1013: Brand: STERI-DRAPE™

## (undated) DEVICE — GOWN,REINF,POLY,SIRUS,BRTH SLV,XLNG/XXL: Brand: MEDLINE

## (undated) DEVICE — GLV SURG SENSICARE SLT PF LF 9 STRL

## (undated) DEVICE — TRITOME™ TRIPLE-EDGE RELEASE INSTRUMENT: Brand: OSTEOTOME

## (undated) DEVICE — CONTAINER,SPECIMEN,O.R.STRL,4.5OZ: Brand: MEDLINE

## (undated) DEVICE — NEEDLE,22GX1.5",REG,BEVEL: Brand: MEDLINE

## (undated) DEVICE — SPEEDRELEASE™ GUIDED RELEASE INSTRUMENT: Brand: SCALPEL

## (undated) DEVICE — BANDAGE,GAUZE,BULKEE II,4.5"X4.1YD,STRL: Brand: MEDLINE

## (undated) DEVICE — APPL CHLORAPREP HI/LITE 26ML ORNG

## (undated) DEVICE — BNDG ELAS MATRX V/CLS 6INX10YD LF

## (undated) DEVICE — GAUZE,SPONGE,4"X4",16PLY,STRL,LF,10/TRAY: Brand: MEDLINE

## (undated) DEVICE — EXTREMITY-LF: Brand: MEDLINE INDUSTRIES, INC.

## (undated) DEVICE — PENCL SMOKE/EVAC MEGADYNE TELESCP 10FT

## (undated) DEVICE — DRESSING,GAUZE,XEROFORM,CURAD,1"X8",ST: Brand: CURAD

## (undated) DEVICE — BNDG ESMARK 4IN 12FT LF STRL BLU

## (undated) DEVICE — HOOKED OSTEOTOME: Brand: OSTEOTOME

## (undated) DEVICE — AUTOGRAFT HARVESTING SYSTEM: Brand: FASTGRAFTER

## (undated) DEVICE — DISPOSABLE TOURNIQUET CUFF SINGLE BLADDER, SINGLE PORT AND QUICK CONNECT CONNECTOR: Brand: COLOR CUFF

## (undated) DEVICE — UNDERCAST PADDING: Brand: DEROYAL